# Patient Record
Sex: FEMALE | Race: WHITE
[De-identification: names, ages, dates, MRNs, and addresses within clinical notes are randomized per-mention and may not be internally consistent; named-entity substitution may affect disease eponyms.]

---

## 2023-07-18 ENCOUNTER — HOSPITAL ENCOUNTER (EMERGENCY)
Dept: HOSPITAL 97 - ER | Age: 63
Discharge: HOME | End: 2023-07-18
Payer: COMMERCIAL

## 2023-07-18 VITALS — SYSTOLIC BLOOD PRESSURE: 149 MMHG | DIASTOLIC BLOOD PRESSURE: 85 MMHG

## 2023-07-18 VITALS — TEMPERATURE: 99 F | OXYGEN SATURATION: 100 %

## 2023-07-18 DIAGNOSIS — Z88.1: ICD-10-CM

## 2023-07-18 DIAGNOSIS — N39.0: Primary | ICD-10-CM

## 2023-07-18 LAB
BUN BLD-MCNC: 13 MG/DL (ref 7–18)
GLUCOSE SERPLBLD-MCNC: 95 MG/DL (ref 74–106)
HCT VFR BLD CALC: 37.9 % (ref 36–45)
LYMPHOCYTES # SPEC AUTO: 2.9 K/UL (ref 0.7–4.9)
MCV RBC: 91.2 FL (ref 80–100)
PMV BLD: 7.5 FL (ref 7.6–11.3)
POTASSIUM SERPL-SCNC: 3 MEQ/L (ref 3.5–5.1)
RBC # BLD: 4.15 M/UL (ref 3.86–4.86)

## 2023-07-18 PROCEDURE — 74177 CT ABD & PELVIS W/CONTRAST: CPT

## 2023-07-18 PROCEDURE — 87088 URINE BACTERIA CULTURE: CPT

## 2023-07-18 PROCEDURE — 81001 URINALYSIS AUTO W/SCOPE: CPT

## 2023-07-18 PROCEDURE — 80048 BASIC METABOLIC PNL TOTAL CA: CPT

## 2023-07-18 PROCEDURE — 36415 COLL VENOUS BLD VENIPUNCTURE: CPT

## 2023-07-18 PROCEDURE — 87086 URINE CULTURE/COLONY COUNT: CPT

## 2023-07-18 PROCEDURE — 85025 COMPLETE CBC W/AUTO DIFF WBC: CPT

## 2023-07-18 NOTE — ER
Nurse's Notes                                                                                     

 [facility]                                                                 

Name: Shazia Contreras                                                                                 

Age: 62 yrs                                                                                       

Sex: Female                                                                                       

: 1960                                                                                   

MRN: [id number]                                                                                   

Arrival Date: 2023                                                                          

Time: 13:31                                                                                       

Account#: [id number]                                                                            

Bed 7                                                                                             

Private MD:                                                                                       

Diagnosis: UTI/ Urinary tract infection, site not specified                                       

                                                                                                  

Presentation:                                                                                     

                                                                                             

13:42 Chief complaint: Patient states: feels like she has a UTI, urgency, suprapubic pain     iw  

      since last night, worse now, she had a really bad kidney infection in May. Coronavirus      

      screen: At this time, the client does not indicate any symptoms associated with             

      coronavirus-19. Ebola Screen: Patient negative for fever greater than or equal to 101.5     

      degrees Fahrenheit, and additional compatible Ebola Virus Disease symptoms Patient          

      denies exposure to infectious person. Patient denies travel to an Ebola-affected area       

      in the 21 days before illness onset. No symptoms or risks identified at this time.          

      Initial Sepsis Screen: Does the patient meet any 2 criteria? No. Patient's initial          

      sepsis screen is negative. Does the patient have a suspected source of infection? No.       

      Patient's initial sepsis screen is negative. Risk Assessment: Do you want to hurt           

      yourself or someone else? Patient reports no desire to harm self or others. Onset of        

      symptoms was 2023.                                                                 

13:42 Acuity: ROCIO 3                                                                           iw  

13:42 Method Of Arrival: Ambulatory                                                           iw  

                                                                                                  

Historical:                                                                                       

- Allergies:                                                                                      

13:47 Keflex;                                                                                 iw  

13:47 Flagyl;                                                                                 iw  

- PMHx:                                                                                           

13:47 spinal stenosis; Asthma;                                                                iw  

                                                                                                  

- Immunization history:: Adult Immunizations unknown.                                             

- Social history:: Smoking status: Patient denies any tobacco usage or history of.                

                                                                                                  

                                                                                                  

Screenin:45 [facility] ED Fall Risk Assessment (Adult) Score/Fall Risk Level 0 - 2 = Low Risk. Abuse  eh3 

      screen: Denies threats or abuse. Denies injuries from another. Nutritional screening:       

      No deficits noted. Tuberculosis screening: No symptoms or risk factors identified.          

                                                                                                  

Assessment:                                                                                       

13:45 General: Appears in no apparent distress. uncomfortable, Behavior is cooperative,       eh3 

      appropriate for age. Pain: Complains of pain in abdomen and pelvis. Neuro: Level of         

      Consciousness is awake, alert, obeys commands, Oriented to person, place, time,             

      situation. Cardiovascular: Capillary refill < 3 seconds Patient's skin is warm and dry.     

      Respiratory: Airway is patent Respiratory effort is even, unlabored, Respiratory            

      pattern is regular, symmetrical. GI: Abdomen is round non-distended. : Reports            

      burning with urination, urgency, urinary frequency. Derm: Skin is healthy with good         

      turgor. Musculoskeletal: Circulation, motion, and sensation intact.                         

14:45 Reassessment: Patient appears in no apparent distress at this time. Patient and/or      eh3 

      family updated on plan of care and expected duration. Pain level reassessed. Patient is     

      alert, oriented x 3, equal unlabored respirations, skin warm/dry/pink.                      

15:45 Reassessment: Patient appears in no apparent distress at this time. Patient and/or      eh3 

      family updated on plan of care and expected duration. Pain level reassessed. Patient is     

      alert, oriented x 3, equal unlabored respirations, skin warm/dry/pink.                      

16:45 Reassessment: Patient appears in no apparent distress at this time. Patient and/or      eh3 

      family updated on plan of care and expected duration. Pain level reassessed. Patient is     

      alert, oriented x 3, equal unlabored respirations, skin warm/dry/pink.                      

17:45 Reassessment: Patient appears in no apparent distress at this time. Patient and/or      eh3 

      family updated on plan of care and expected duration. Pain level reassessed. Patient is     

      alert, oriented x 3, equal unlabored respirations, skin warm/dry/pink.                      

18:45 Reassessment: Patient appears in no apparent distress at this time. Patient and/or      eh3 

      family updated on plan of care and expected duration. Pain level reassessed. Patient is     

      alert, oriented x 3, equal unlabored respirations, skin warm/dry/pink.                      

                                                                                                  

Vital Signs:                                                                                      

14:04  / 89; Pulse 77; Resp 18; Temp 99(TE); Pulse Ox 100% on R/A; Weight 50.8 kg;      em1 

      Height 5 ft. 2 in. ; Pain 10/10;                                                            

14:45  / 80; Pulse 76; Resp 18; Pulse Ox 100% on R/A;                                   eh3 

15:45  / 67; Pulse 68; Resp 18; Pulse Ox 100% on R/A;                                   eh3 

16:45  / 81; Pulse 71; Resp 18; Pulse Ox 100% on R/A;                                   eh3 

17:45  / 70; Pulse 68; Resp 18; Pulse Ox 100% on R/A;                                   eh3 

18:45  / 85; Pulse 64; Resp 18; Pulse Ox 100% on R/A;                                   eh3 

14:04 Body Mass Index 20.48 (50.80 kg, 157.48 cm)                                             em1 

14:04 Pain Scale: Adult                                                                       em1 

                                                                                                  

ED Course:                                                                                        

13:34 Patient arrived in ED.                                                                  mr  

13:34 Lexx Arevalo MD is Attending Physician.                                              kdr 

13:37 Romi Valderrama RN is Primary Nurse.                                                        eh3 

13:45 Patient has correct armband on for positive identification. Placed in gown. Bed in low  eh3 

      position. Call light in reach. Side rails up X2. Pulse ox on. NIBP on. Door closed.         

      Noise minimized. Lights dimmed.                                                             

13:47 Triage completed.                                                                       iw  

13:47 Arm band placed on.                                                                     iw  

14:45 Assisted to bedside commode.                                                            eh3 

16:35 Inserted saline lock: 22 gauge in right antecubital area, using aseptic technique.      eh3 

      Blood collected.                                                                            

18:50 CT Abd/Pelvis - IV Contrast Only In Process Unspecified.                                EDMS

19:13 Attending Physician role handed off by Lexx Arevalo MD                               rn  

19:13 Jerry Vo MD is Attending Physician.                                                rn  

19:21 No provider procedures requiring assistance completed. IV discontinued, intact,         eh3 

      bleeding controlled, No redness/swelling at site. Pressure dressing applied.                

                                                                                                  

Administered Medications:                                                                         

14:22 Drug: Norco PO 5 mg-325 mg 1 tabs Route: PO;                                            kc6 

15:25 Follow up: Response: No adverse reaction                                                eh3 

                                                                                                  

                                                                                                  

Medication:                                                                                       

19:22 VIS not applicable for this client.                                                     eh3 

                                                                                                  

Outcome:                                                                                          

19:15 Discharge ordered by MD.                                                                rn  

19:21 Discharged to home ambulatory.                                                          eh3 

19:21 Condition: stable                                                                           

19:21 Discharge instructions given to patient, Instructed on discharge instructions, follow       

      up and referral plans. medication usage, Demonstrated understanding of instructions,        

      follow-up care, medications, Prescriptions given X 1.                                       

19:22 Patient left the ED.                                                                    eh3 

                                                                                                  

Signatures:                                                                                       

Dispatcher [employer]                           EDMS                                                 

Lexx Arevalo MD MD kdr Rivera, Mary                                                                                    

Anais Landa RN                     RN   iw                                                   

Jerry Vo MD MD rn Martinez, Eric                               em1                                                  

Romi Valderrama RN RN   3                                                  

Merrill, Monica, RN                   RN   kc6                                                  

                                                                                                  

**************************************************************************************************

## 2023-07-18 NOTE — RAD REPORT
EXAM DESCRIPTION:  CTAbdomen   Pelvis W Contrast - 7/18/2023 6:48 pm

 

CLINICAL HISTORY:  Abdominal pain.

Pelvic pain

 

COMPARISON:  <Comparisons>

 

TECHNIQUE:  Biphasic CT imaging of the abdomen and pelvis was performed with 100 ml non-ionic IV cont
rast.

 

All CT scans are performed using dose optimization technique as appropriate and may include automated
 exposure control or mA/KV adjustment according to patient size.

 

FINDINGS:  The lung bases are clear.

 

The liver, spleen, pancreas, adrenal glands and kidneys are within normal limits.

 

No bowel obstruction, free air, free fluid or abscess.  The appendix is normal.  No evidence of signi
ficant lymphadenopathy. There is a large amount of stool retained in the colon.

 

No suspicious bony findings.

 

IMPRESSION:  No acute intra-abdominal or pelvic finding.

 

Significant fecal retention.

## 2023-07-18 NOTE — EDPHYS
Physician Documentation                                                                           

 [facility]                                                                 

Name: Shazia Contreras                                                                                 

Age: 62 yrs                                                                                       

Sex: Female                                                                                       

: 1960                                                                                   

MRN: [id number]                                                                                   

Arrival Date: 2023                                                                          

Time: 13:31                                                                                       

Account#: [id number]                                                                            

Bed 7                                                                                             

Private MD:                                                                                       

ED Physician Jerry Vo                                                                         

HPI:                                                                                              

                                                                                             

17:01 This 62 yrs old Female presents to ER via Ambulatory with complaints of Urinary Problem.kdr 

17:01 Patient presents with urinary tract symptoms. She has been having intermittent          kdr 

      infections since May. She has been on antibiotics for 3 separate occasions. She was         

      initially placed on Levaquin and did get some results and improvement. Once that was        

      completed she had a period of wellness and then had a return of her symptoms. She was       

      subsequently put on cefuroxime and improved again with that medication and subsequently     

      had a period of wellness. Then most recently she was put on a 10-day course of Bactrim      

      with return of symptoms and again had some improvement until today. Patient's not had       

      anything like this before. She is not currently sexually active. Her last sexual            

      activity was number of months ago. She denies any other injury she denies vaginal           

      discharge. She looks mildly uncomfortable but nontoxic. Onset: The symptoms/episode         

      began/occurred 2 day(s) ago. Severity of symptoms: At their worst the symptoms were         

      mild moderate just prior to arrival, in the emergency department the symptoms are           

      unchanged. The patient has not experienced similar symptoms in the past. The patient        

      has not recently seen a physician.                                                          

                                                                                                  

Historical:                                                                                       

- Allergies:                                                                                      

13:47 Keflex;                                                                                 iw  

13:47 Flagyl;                                                                                 iw  

- PMHx:                                                                                           

13:47 spinal stenosis; Asthma;                                                                iw  

                                                                                                  

- Immunization history:: Adult Immunizations unknown.                                             

- Social history:: Smoking status: Patient denies any tobacco usage or history of.                

                                                                                                  

                                                                                                  

ROS:                                                                                              

17:01 Constitutional: Negative for fever, chills, and weight loss, Eyes: Negative for injury, kdr 

      pain, redness, and discharge, Neck: Negative for injury, pain, and swelling,                

      Cardiovascular: Negative for chest pain, palpitations, and edema, Respiratory: Negative     

      for shortness of breath, cough, wheezing, and pleuritic chest pain, Abdomen/GI:             

      Negative for abdominal pain, nausea, vomiting, diarrhea, and constipation, Back:            

      Negative for injury and pain, MS/Extremity: Negative for injury and deformity, Skin:        

      Negative for injury, rash, and discoloration, Neuro: Negative for headache, weakness,       

      numbness, tingling, and seizure activity. Psych: Negative for depression, anxiety,          

      suicide ideation, homicidal ideation, and hallucinations, Allergy/Immunology: Negative      

      for hives, rash, and allergies, Endocrine: Negative for neck swelling, polydipsia,          

      polyuria, polyphagia, and marked weight changes, Hematologic/Lymphatic: Negative for        

      swollen nodes, abnormal bleeding, and unusual bruising.                                     

                                                                                                  

Exam:                                                                                             

17:02 Constitutional:  This is a well developed, well nourished patient who is awake, alert,  kdr 

      and in no acute distress. Head/Face:  Normocephalic, atraumatic. Eyes:  Pupils equal        

      round and reactive to light, extra-ocular motions intact.  Lids and lashes normal.          

      Conjunctiva and sclera are non-icteric and not injected.  Cornea within normal limits.      

      Periorbital areas with no swelling, redness, or edema. Neck:  Trachea midline, no           

      thyromegaly or masses palpated, and no cervical lymphadenopathy.  Supple, full range of     

      motion without nuchal rigidity, or vertebral point tenderness.  No Meningismus.             

      Chest/axilla:  Normal chest wall appearance and motion.  Nontender with no deformity.       

      No lesions are appreciated. Cardiovascular:  Regular rate and rhythm with a normal S1       

      and S2.  No gallops, murmurs, or rubs.  Normal PMI, no JVD.  No pulse deficits.             

      Respiratory:  Lungs have equal breath sounds bilaterally, clear to auscultation and         

      percussion.  No rales, rhonchi or wheezes noted.  No increased work of breathing, no        

      retractions or nasal flaring. Abdomen/GI:  Soft, non-tender, with normal bowel sounds.      

      No distension or tympany.  No guarding or rebound.  No evidence of tenderness               

      throughout. Back:  No spinal tenderness.  No costovertebral tenderness.  Full range of      

      motion. Skin:  Warm, dry with normal turgor.  Normal color with no rashes, no lesions,      

      and no evidence of cellulitis. MS/ Extremity:  Pulses equal, no cyanosis.                   

      Neurovascular intact.  Full, normal range of motion. Neuro:  Awake and alert, GCS 15,       

      oriented to person, place, time, and situation.  Cranial nerves II-XII grossly intact.      

      Motor strength 5/5 in all extremities.  Sensory grossly intact.  Cerebellar exam            

      normal.  Normal gait. Psych:  Awake, alert, with orientation to person, place and time.     

       Behavior, mood, and affect are within normal limits.                                       

                                                                                                  

Vital Signs:                                                                                      

14:04  / 89; Pulse 77; Resp 18; Temp 99(TE); Pulse Ox 100% on R/A; Weight 50.8 kg;      em1 

      Height 5 ft. 2 in. ; Pain 10/10;                                                            

14:45  / 80; Pulse 76; Resp 18; Pulse Ox 100% on R/A;                                   eh3 

15:45  / 67; Pulse 68; Resp 18; Pulse Ox 100% on R/A;                                   eh3 

16:45  / 81; Pulse 71; Resp 18; Pulse Ox 100% on R/A;                                   eh3 

17:45  / 70; Pulse 68; Resp 18; Pulse Ox 100% on R/A;                                   eh3 

18:45  / 85; Pulse 64; Resp 18; Pulse Ox 100% on R/A;                                   eh3 

14:04 Body Mass Index 20.48 (50.80 kg, 157.48 cm)                                             em1 

14:04 Pain Scale: Adult                                                                       em1 

                                                                                                  

MDM:                                                                                              

17:02 Data reviewed: vital signs, nurses notes. ED course: I discussed the initial lab        kdr 

      results (urinalysis) with the patient, while there was a suggestion of a urinary tract      

      infection is not an overwhelming showing. We discussed her pain and whether it was          

      strictly related to her urination in her urethra where there was more diffuse or            

      involving more of her pelvis. Patient felt that while at was primarily urethral in          

      nature she did feel generally uncomfortable to her pelvis. Given that information, she      

      requested a CAT scan of her abdomen and pelvis which we proceeded to do. Patient            

      otherwise was stable in the ED..                                                            

19:13 Differential Diagnosis UTI, pyelonephritis, dehydration, constipation. Counseling: I    rn  

      had a detailed discussion with the patient and/or guardian regarding: the historical        

      points, exam findings, and any diagnostic results supporting the discharge/admit            

      diagnosis, lab results, radiology results, the need for outpatient follow up, to return     

      to the emergency department if symptoms worsen or persist or if there are any questions     

      or concerns that arise at home. Special discussion: I discussed with the                    

      patient/guardian in detail that at this point there is no indication for admission to       

      the hospital. It is understood, however, that if the symptoms persist or worsen the         

      patient needs to return immediately for re-evaluation. ED course: Pt signed out to me       

      by Dr. Arevalo, pending CT abdomen, plan was to dc home with levaquin if CT abdomen         

      neg. CT abdomen returned without acute findings, no pyelonephritis, will dc home as         

      planned with Dr. Arevalo with levaquin and pcp f/u. .                                       

19:15 Patient medically screened.                                                             rn  

                                                                                                  

                                                                                             

13:39 Order name: Urinalysis w/ reflexes; Complete Time: 16:13                                kdr 

                                                                                             

14:03 Order name: Urine Culture                                                               kdr 

                                                                                             

16:23 Order name: CBC with Diff; Complete Time: 19:00                                         kdr 

                                                                                             

16:23 Order name: Chem 7; Complete Time: 19:00                                                kdr 

                                                                                             

16:23 Order name: CT Abd/Pelvis - IV Contrast Only; Complete Time: 19:13                      kdr 

                                                                                                  

Administered Medications:                                                                         

14:22 Drug: Norco PO 5 mg-325 mg 1 tabs Route: PO;                                            kc6 

15:25 Follow up: Response: No adverse reaction                                                eh3 

                                                                                                  

                                                                                                  

Disposition Summary:                                                                              

23 19:15                                                                                    

Discharge Ordered                                                                                 

      Location: Home                                                                          rn  

      Problem: an ongoing problem                                                             rn  

      Symptoms: have improved                                                                 rn  

      Condition: Stable                                                                       rn  

      Diagnosis                                                                                   

        - UTI/ Urinary tract infection, site not specified                                    rn  

      Followup:                                                                               rn  

        - With: Private Physician                                                                  

        - When: As needed                                                                          

        - Reason: Recheck today's complaints, Re-evaluation by your physician                      

      Discharge Instructions:                                                                     

        - Discharge Summary Sheet                                                             rn  

        - Urinary Tract Infection, Adult                                                      rn  

      Forms:                                                                                      

        - Medication Reconciliation Form                                                      rn  

        - Thank You Letter                                                                    rn  

        - Antibiotic Education                                                                rn  

        - Prescription Opioid Use                                                             rn  

        - Patient Portal Instructions                                                         rn  

      Prescriptions:                                                                              

        - levofloxacin 500 mg Oral Tablet                                                          

            - take 1 tablet by ORAL route once daily for 10 days; 10 tablet; Refills: 0,      rn  

      Product Selection Permitted                                                                 

Signatures:                                                                                       

Dispatcher [employer]                           EDMS                                                 

Lexx Arevalo MD MD kdr Williams, Irene RN                     Jerry Colorado MD MD rn Campbell, Kaitlyn, RN RN   [facility]                                                  

Romi Valderrama RN   3                                                  

                                                                                                  

**************************************************************************************************

## 2023-07-18 NOTE — XMS REPORT
Continuity of Care Document

                            Created on:2023



Patient:HERMAN BOLES

Sex:Female

:1960

External Reference #:[id number]





Demographics







                          Address                   [address]



                                                    [place] [address]

 

                          Home Phone                [phone]

 

                          Email Address             [email or web address]

 

                          Preferred Language        English

 

                          Marital Status            Unknown

 

                          Christian Affiliation     Unknown

 

                          Race                      Unknown

 

                          Additional Race(s)        Unavailable



                                                    White

 

                          Ethnic Group              Not  or 









Author







                          Organization              [facility]

t

 

                          Address                   [address]



                                                    [place] [address]

 

                          Phone                     [phone]









Support







                Name            Relationship    Address         Phone

 

                DO OBIE YANEZ Emergency Provider [address] [phone]



                                                [place] [address] 

 

                LORENZA PARIS Parent          Unavailable     [phone]

 

                LORENZA PARIS Parent          Unavailable     [phone]

 

                HERMAN BOLES Guarantor       [address] [phone]



                                                [place] [address] 









Care Team Providers







                    Name                Role                Phone

 

                    DENIS YADAV Primary Care Physician Unavailable

 

                    SULLY GRIDER   Attending Clinician Unavailable

 

                    DENIS YADAV Attending Clinician Unavailable

 

                    [facility] Attending Clinician UnavailOBIE Gabriel     Attending Clinician Unavailable









Problems







       Condition Condition Condition Status Onset  Resolution Last   Treating Co

mments 

Source



       Name   Details Category        Date   Date   Treatment Clinician        



                                                 Date                 

 

       Problem        Condition                                           [facility]







Allergies, Adverse Reactions, Alerts







       Allergy Allergy Status Severity Reaction(s) Onset  Inactive Treating Comm

ents 

Source



       Name   Type                        Date   Date   Clinician        

 

       Cephalex Allergy Active Unknown                              [facility]

U



       in     to                                                  S



              substanc                      00:00:                      Health



              e                           00                          

 

       Ketamine Allergy Active Unknown                              [facility]

U



              to                          208                        S



              substanc                      00:00:                      Health



              e                           00                          







Social History







           Social Habit Start Date Stop Date  Quantity   Comments   Source

 

           Sex Assigned At 1960 Female                [facility]



           Birth      00:00:00   00:00:00                         









                Smoking Status  Start Date      Stop Date       Source

 

                Never smoked tobacco (finding)                                 C

PolySpot







Medications







       Ordered Filled Start  Stop   Current Ordering Indication Dosage Frequency

 Signature

                    Comments            Components          Source



     Medication Medication Date Date Medication? Clinician                (SIG) 

          



     Name Name                                                   

 

     Sucralfate      2019-0      No             1         Before           



     (Carafate)      2-08                               Meals And           S



     1 Gm TAB      23:08:                               At Bedtime           Hea

lth



               00                                                

 

     Sucralfate      2019-0      No             1         Before           DAJA

TU



     (Carafate)      2-08                               Meals And           S



     1 Gm TAB      23:08:                               At Bedtime           Hea

lth



               00                                                

 

     Acetaminoph                No             1         Every 6           DAJA

TU



     en/Hydrocod                                         Hours as           S



     one Bitart                                         needed for           Hea

lth



     (Norco                                         Pain           



     5/325) 1                                                        



     Tab TAB                                                        

 

     Albuterol                No             2         As Needed           DAJA

TU



     Sulfate                                         as needed           S



     (Ventolin                                         for            Health



     Hfa Inh 18                                         Shortness           



     Gm) 8 Gm                                         Of Breath           



     HFA.AER.AD                                                        

 

     Atorvastati                No                       Bedtime           DAJA

TU



     n Calcium                                                        S



     (Lipitor)                                                        Health



     10 Mg TAB                                                        

 

     Clonazepam                No             .1mg      Bedtime           [facility]

U



     (Klonopin                                                        S



     Liq) 0.1                                                        Health



     Mg/Ml SOLN                                                        

 

     Hctz/Losart                No                       Daily           [facility]



     an                                                          S



     Potassium                                                        Health



     (Hyzaar                                                        



     100-25) 1                                                        



     Each TAB                                                        

 

     Acetaminoph                No             1         Every 6           DAJA

TU



     en/Hydrocod                                         Hours as           S



     one Bitart                                         needed for           Hea

lth



     (Norco                                         Pain           



     5/325) 1                                                        



     Tab TAB                                                        

 

     Albuterol                No             2         As Needed           DAJA

TU



     Sulfate                                         as needed           S



     (Ventolin                                         for            Health



     Hfa Inh 18                                         Shortness           



     Gm) 8 Gm                                         Of Breath           



     HFA.AER.AD                                                        

 

     Atorvastati                No                       Bedtime           [facility]

TU



     n Calcium                                                        S



     (Lipitor)                                                        Health



     10 Mg TAB                                                        

 

     Clonazepam                No             .1mg      Bedtime           [facility]

U



     (Klonopin                                                        S



     Liq) 0.1                                                        Health



     Mg/Ml SOLN                                                        

 

     Hctz/Losart                No                       Daily           [facility]



     an                                                          S



     Potassium                                                        Health



     (Hyzaar                                                        



     100-25) 1                                                        



     Each TAB                                                        







Procedures

This patient has no known procedures.



Encounters







        Start   End     Encounter Admission Attending Care    Care    Encounter 

Source



        Date/Time Date/Time Type    Type    Clinicians Facility Department ID   

   

 

        2021-03-15         Inpatient [facility] [id number]

[id number] [facility]



        08:30:00                         SULLY                    43      S



                                                                        Health

 

        2020         Inpatient [facility] [place] SUE 

[id number] [facility]



        14:00:00                         , DENIS                 78      S



                                                                        Health

 

        2023 Outpatient [facility]  MF0

[id number] [facility]



        12:25:00 12:25:00                 [facility],                    -[id number] [facility]



                                                                        Hospita



                                                                        l

 

        [id number] emergency                 [id number] AA

[id number] 



        06:33:00 11:15:00                         [id number]      



                                                [id number]         



                                                [id number]             

 

        [facility]23 [facility]  [id number]

71 [facility]



        06:33:00 11:15:00                 [id number] [facility]



                                                                        Hospita



                                                                        

 

        2021 Outpatient [facility] SUE [facility]

[id number] 

[facility]



        14:00:00 00:01:00                 , [place]                 73      [facility] [address]

 

        2021-10-26 2021-10-31 Outpatient [facility] S[facility]

[id number] 

[facility]



        13:30:00 00:01:00                 , [place]                 91      [facility] [address]

 

        2021-04-15 2021-04-15 Outpatient [facility] S[facility]OG[facility] AA

[id number] 

[facility]



        13:21:00 13:21:00                 DIR                    82      S



                                                                        Health

 

        2020 Discharged [facility] SUE BORDEN

[id number] 

[facility]



        13:00:00 13:00:00 Recurring         DENIS                 21      S



                                                                        Health

 

        2020 Discharged                 BRITTANY [facility] AA00

[id number] [facility]



        13:47:00 23:59:00 Recurring                 [facility] 55      S



                                                                        Health







Results

This patient has no known results.

## 2023-07-27 ENCOUNTER — HOSPITAL ENCOUNTER (EMERGENCY)
Dept: HOSPITAL 97 - ER | Age: 63
Discharge: HOME | End: 2023-07-27
Payer: COMMERCIAL

## 2023-07-27 VITALS — OXYGEN SATURATION: 98 % | DIASTOLIC BLOOD PRESSURE: 81 MMHG | SYSTOLIC BLOOD PRESSURE: 125 MMHG

## 2023-07-27 VITALS — TEMPERATURE: 99.3 F

## 2023-07-27 DIAGNOSIS — Z88.8: ICD-10-CM

## 2023-07-27 DIAGNOSIS — N30.10: Primary | ICD-10-CM

## 2023-07-27 DIAGNOSIS — Z88.1: ICD-10-CM

## 2023-07-27 LAB
ALBUMIN SERPL BCP-MCNC: 4.1 G/DL (ref 3.4–5)
ALP SERPL-CCNC: 81 U/L (ref 45–117)
ALT SERPL W P-5'-P-CCNC: 57 U/L (ref 13–56)
AST SERPL W P-5'-P-CCNC: 23 U/L (ref 15–37)
BUN BLD-MCNC: 20 MG/DL (ref 7–18)
GLUCOSE SERPLBLD-MCNC: 108 MG/DL (ref 74–106)
HCT VFR BLD CALC: 36.4 % (ref 36–45)
LYMPHOCYTES # SPEC AUTO: 1.3 K/UL (ref 0.7–4.9)
MCV RBC: 89.6 FL (ref 80–100)
PMV BLD: 7 FL (ref 7.6–11.3)
POTASSIUM SERPL-SCNC: 3.2 MEQ/L (ref 3.5–5.1)
RBC # BLD: 4.06 M/UL (ref 3.86–4.86)

## 2023-07-27 PROCEDURE — 81003 URINALYSIS AUTO W/O SCOPE: CPT

## 2023-07-27 PROCEDURE — 85025 COMPLETE CBC W/AUTO DIFF WBC: CPT

## 2023-07-27 PROCEDURE — 80053 COMPREHEN METABOLIC PANEL: CPT

## 2023-07-27 PROCEDURE — 99284 EMERGENCY DEPT VISIT MOD MDM: CPT

## 2023-07-27 PROCEDURE — 36415 COLL VENOUS BLD VENIPUNCTURE: CPT

## 2023-07-27 PROCEDURE — 96360 HYDRATION IV INFUSION INIT: CPT

## 2023-07-27 NOTE — XMS REPORT
Continuity of Care Document

                            Created on:2023



Patient:HERMAN BOLES

Sex:Female

:1960

External Reference #:[id number]





Demographics







                          Address                   [address] [place] [address]

 

                          Home Phone                [phone]

 

                          Email Address             [email or web address]

 

                          Preferred Language        English

 

                          Marital Status            Unknown

 

                          Latter-day Affiliation     Unknown

 

                          Race                      Unknown

 

                          Additional Race(s)        White



                                                    Unavailable

 

                          Ethnic Group              Not  or 









Author







                          Organization              [facility]

t

 

                          Address                   [address]



                                                    [place] [address]

 

                          Phone                     [phone]









Support







                Name            Relationship    Address         Phone

 

                DO OBIE YANEZ Emergency Provider [address] [phone]



                                                [place] [address] 

 

                LORENZA PAR[place] Parent          Unavailable     [phone]

 

                LORENZA PARIS Parent          Unavailable     [phone]

 

                HERMAN BOLES Guarantor       [address] [phone]



                                                [place] [address] 









Care Team Providers







                    Name                Role                Phone

 

                    DENIS YADAV Primary Care Physician Unavailable

 

                    SULLY GRIDER   Attending Clinician Unavailable

 

                    DENIS YADAV Attending Clinician Unavailable

 

                    [facility] Attending Clinician UnavailOBIE Gabriel     Attending Clinician Unavailable









Problems







       Condition Condition Condition Status Onset  Resolution Last   Treating Co

mments 

Source



       Name   Details Category        Date   Date   Treatment Clinician        



                                                 Date                 

 

       Problem        Condition                                           [facility]







Allergies, Adverse Reactions, Alerts







       Allergy Allergy Status Severity Reaction(s) Onset  Inactive Treating Comm

ents 

Source



       Name   Type                        Date   Date   Clinician        

 

       Cephalex Allergy Active Unknown                              [facility]

U



       in     to                                                  S



              substanc                      00:00:                      Health



              e                           00                          

 

       Ketamine Allergy Active Unknown                              [facility]

U



              to                          2                        S



              substanc                      00:00:                      Health



              e                           00                          







Social History







           Social Habit Start Date Stop Date  Quantity   Comments   Source

 

           Sex Assigned At 1960 Female                [facility]



           Birth      00:00:00   00:00:00                         









                Smoking Status  Start Date      Stop Date       Source

 

                Never smoked tobacco (finding)                                 C

CR2







Medications







       Ordered Filled Start  Stop   Current Ordering Indication Dosage Frequency

 Signature

                    Comments            Components          Source



     Medication Medication Date Date Medication? Clinician                (SIG) 

          



     Name Name                                                   

 

     Sucralfate      2019-0      No             1         Before           DAJA





     (Carafate)      2-                               Meals And           S



     1 Gm TAB      23:08:                               At Bedtime           Hea

lth



               00                                                

 

     Sucralfate      2019-0      No             1         Before           DAJA

TU



     (Carafate)      2-08                               Meals And           S



     1 Gm TAB      23:08:                               At Bedtime           Hea

lth



               00                                                

 

     Acetaminoph                No             1         Every 6           DAJA

TU



     en/Hydrocod                                         Hours as           S



     one Bitart                                         needed for           Hea

lth



     (Norco                                         Pain           



     5/325) 1                                                        



     Tab TAB                                                        

 

     Albuterol                No             2         As Needed           DAJA

TU



     Sulfate                                         as needed           S



     (Ventolin                                         for            Health



     Hfa Inh 18                                         Shortness           



     Gm) 8 Gm                                         Of Breath           



     HFA.AER.AD                                                        

 

     Atorvastati                No                       Bedtime           [facility]

TU



     n Calcium                                                        S



     (Lipitor)                                                        Health



     10 Mg TAB                                                        

 

     Clonazepam                No             .1mg      Bedtime           [facility]

U



     (Klonopin                                                        S



     Liq) 0.1                                                        Health



     Mg/Ml SOLN                                                        

 

     Hctz/Losart                No                       Daily           [facility]



     an                                                          S



     Potassium                                                        Health



     (Hyzaar                                                        



     100-25) 1                                                        



     Each TAB                                                        

 

     Acetaminoph                No             1         Every 6           DAJA

TU



     en/Hydrocod                                         Hours as           S



     one Bitart                                         needed for           Hea

lth



     (Norco                                         Pain           



     5/325) 1                                                        



     Tab TAB                                                        

 

     Albuterol                No             2         As Needed           DAJA

TU



     Sulfate                                         as needed           S



     (Ventolin                                         for            Health



     Hfa Inh 18                                         Shortness           



     Gm) 8 Gm                                         Of Breath           



     HFA.AER.AD                                                        

 

     Atorvastati                No                       Bedtime           DAJA

TU



     n Calcium                                                        S



     (Lipitor)                                                        Health



     10 Mg TAB                                                        

 

     Clonazepam                No             .1mg      Bedtime           [facility]

U



     (Klonopin                                                        S



     Liq) 0.1                                                        Health



     Mg/Ml SOLN                                                        

 

     Hctz/Losart                No                       Daily           [facility]



     an                                                          S



     Potassium                                                        Health



     (Hyzaar                                                        



     100-25) 1                                                        



     Each TAB                                                        







Procedures

This patient has no known procedures.



Encounters







        Start   End     Encounter Admission Attending Care    Care    Encounter 

Source



        Date/Time Date/Time Type    Type    Clinicians Facility Department ID   

   

 

        2021-03-15         Inpatient [facility] [id number]

[id number] [facility]



        08:30:00                         [facility]                    43      [facility]

 

        2020         Inpatient [facility] 

[id number] [facility]



        14:00:00                         , DENIS                 [address] [place] [address]

 

        2023 Outpatient [facility]  MF0

[id number] [facility]



        12:25:00 12:25:00                 SHARON,                    -[id number] [facility]



                                                                        Hospita



                                                                        l

 

        2023 emergency                 [id number] AA

[id number] 



        06:33:00 11:15:00                         [id number]      



                                                [id number]         



                                                [id number]             

 

        2023 Emergency ER      [facility], [facility]  [id number]

71 [facility]



        06:33:00 11:15:00                 [id number] [facility]



                                                                        Hospita



                                                                        

 

        2021 Outpatient [facility] SUE [facility]

[id number] 

[facility]



        14:00:00 00:01:00                 , [place]                 73      S



                                                                        Health

 

        2021-10-26 2021-10-31 Outpatient [facility] S[facility]

[id number] 

[facility]



        13:30:00 00:01:00                 , DENIS                 91      S



                                                                        Health

 

        2021-04-15 2021-04-15 Outpatient [facility] SUE ROG[facility] AA

[id number] 

[facility]



        13:21:00 13:21:00                 DIRK                    82      S



                                                                        Health

 

        2020 Discharged [facility] SUE BORDEN

[id number] 

[facility]



        13:00:00 13:00:00 Recurring         DENIS                 21      S



                                                                        Health

 

        2020 Discharged                 [facility] AA00

[id number] [facility]



        13:47:00 23:59:00 Recurring                 [facility] 55      S



                                                                        Health







Results

This patient has no known results.

## 2023-07-27 NOTE — EDPHYS
Physician Documentation                                                                           

 [facility]                                                                 

Name: Shazia Contreras                                                                                 

Age: 62 yrs                                                                                       

Sex: Female                                                                                       

: 1960                                                                                   

MRN: [id number]                                                                                   

Arrival Date: 2023                                                                          

Time: 13:44                                                                                       

Account#: [id number]                                                                            

Bed 9                                                                                             

Private MD:                                                                                       

ED Physician Gregg Pineda                                                                         

HPI:                                                                                              

                                                                                             

14:26 This 62 yrs old Female presents to ER via Ambulatory with complaints of Urinary Problem.ms3 

14:26 62-year-old female with past medical history of asthma, spinal stenosis presents for    ms3 

      urinary frequency, urgency, dysuria. Patient states she was diagnosed with                  

      pyelonephritis on 2023. Patient was then diagnosed with a urinary tract             

      infection in this emergency department on . Patient states for the last 3 days       

      her symptoms have become worse and she has developed body aches, fever, chills, nausea.     

      Patient denies vomiting. Patient states her symptoms are worse with urination. Patient      

      denies alleviating factors.                                                                 

                                                                                                  

Historical:                                                                                       

- Allergies:                                                                                      

13:52 Flagyl;                                                                                 aa5 

13:52 Keflex;                                                                                 aa5 

- PMHx:                                                                                           

13:52 Asthma; spinal stenosis;                                                                aa5 

                                                                                                  

- Immunization history:: Adult Immunizations unknown.                                             

- Social history:: Smoking status: Patient denies any tobacco usage or history of.                

                                                                                                  

                                                                                                  

ROS:                                                                                              

14:26  Positive for urinary symptoms, urinary frequency, burning with urination.            ms3 

14:26 Constitutional: Negative for fever, and chills. ENT: Negative for injury, pain, and         

      discharge, Neck: Negative for injury, pain, and swelling, Cardiovascular: Negative for      

      chest pain, and palpitations. Respiratory: Negative for shortness of breath, cough,         

      wheezing, and pleuritic chest pain, Abdomen/GI: Negative for abdominal pain, nausea,        

      vomiting, diarrhea, and constipation, MS/Extremity: Negative for injury and deformity,      

      Skin: Negative for injury, rash, and discoloration.                                         

14:26 All other systems are negative.                                                             

                                                                                                  

Exam:                                                                                             

14:26 Constitutional:  This is a well developed, well nourished patient who is awake, alert,  ms3 

      and in no acute distress. Head/Face:  Normocephalic, atraumatic. Neck:  Trachea             

      midline, no cervical lymphadenopathy.  Supple, full range of motion without nuchal          

      rigidity, or vertebral point tenderness.  No Meningismus. Chest/axilla:  Normal chest       

      wall appearance and motion.  Nontender with no deformity.   Cardiovascular:  Regular        

      rate and rhythm with a normal S1 and S2.  No gallops, murmurs, or rubs.  Normal PMI, no     

      JVD.  No pulse deficits. Respiratory:  Lungs have equal breath sounds bilaterally,          

      clear to auscultation and percussion.  No rales, rhonchi or wheezes noted.  No              

      increased work of breathing, no retractions or nasal flaring. Abdomen/GI:  Soft,            

      non-tender, with normal bowel sounds.  No distension or tympany.  No guarding or            

      rebound.  No evidence of tenderness throughout. Female :  Normal external genitalia.      

      Skin:  Warm, dry with normal turgor.  Normal color with no rashes, no lesions, and no       

      evidence of cellulitis. MS/ Extremity:  Pulses equal, no cyanosis.  Neurovascular           

      intact.  Full, normal range of motion.                                                      

14:26 : Chaperone: JUAN Marlow.                                                                

                                                                                                  

Vital Signs:                                                                                      

13:50  / 97; Pulse 100; Resp 20 S; Temp 97.1(TE); Pulse Ox 100% on R/A; Weight 49.9 kg  aa5 

      (R); Height 5 ft. 2 in. (R);                                                                

14:40  / 87; Pulse 88; Resp 17; Temp 99.3(O); Pulse Ox 100% on R/A; Pain 9/10;          me1 

15:29  / 81; Pulse 84; Resp 17; Pulse Ox 98% on R/A; Pain 7/10;                         me1 

13:50 Body Mass Index 20.12 (49.90 kg, 157.48 cm)                                             aa5 

14:40 Pain Scale: Adult                                                                       me1 

15:29 Pain Scale: Adult                                                                       me1 

                                                                                                  

MDM:                                                                                              

14:08 Patient medically screened.                                                             ms3 

17:24 Differential diagnosis: urinary tract infection, Interstitial cystitis versus dysuria.  ms3 

      Data reviewed: vital signs, nurses notes, lab test result(s), and as a result, I will       

      discharge patient. I considered the following discharge prescriptions or medication         

      management in the emergency department Medications were administered in the Emergency       

      Department. See MAR. Counseling: I had a detailed discussion with the patient and/or        

      guardian regarding: the historical points, exam findings, and any diagnostic results        

      supporting the discharge/admit diagnosis, lab results, the need for outpatient follow       

      up, to return to the emergency department if symptoms worsen or persist or if there are     

      any questions or concerns that arise at home. ED course: Discussed follow-up with Dr. Beaulieu and Dr. Bray with patient. Patient understands and agrees with plan. All           

      questions were answered. Patient given prescription for amitriptyline. UA negative          

      today, white blood count normal. Return precautions discussed include worsening             

      symptoms, or any other concerns. On reevaluation patient is alert and oriented x4, no       

      apparent distress, nontoxic-appearing, ambulatory in emergency department, speaking         

      full sentences.                                                                             

                                                                                                  

                                                                                             

14:03 Order name: CBC with Diff; Complete Time: 15:09                                         ms3 

                                                                                             

14:03 Order name: CMP; Complete Time: 15:19                                                   ms3 

                                                                                             

14:03 Order name: Urinalysis w/ reflexes; Complete Time: 15:09                                ms3 

                                                                                                  

Administered Medications:                                                                         

14:14 Drug: Phenazopyridine  mg Route: PO;                                              me1 

14:50 Follow up: Response: No adverse reaction; Marked relief of symptoms                     me1 

14:44 Drug: Lactated Ringers Solution IV 1000 ml Route: IV; Rate: bolus; Site: left           me1 

      antecubital;                                                                                

15:55 Follow up: IV Status: Completed infusion; IV Intake: 1000ml                             me1 

                                                                                                  

                                                                                                  

Disposition Summary:                                                                              

23 15:54                                                                                    

Discharge Ordered                                                                                 

      Location: Home                                                                          ms3 

      Condition: Stable                                                                       ms3 

      Diagnosis                                                                                   

        - Dysuria                                                                             ms3 

        - Interstitial cystitis (chronic) without hematuria                                   ms3 

      Followup:                                                                               ms3 

        - With: Ervin Beaulieu DO                                                                  

        - When: 2 - 3 days                                                                         

        - Reason: Recheck today's complaints                                                       

      Followup:                                                                               ms3 

        - With: Damian rBay MD                                                                

        - When: 2 - 3 days                                                                         

        - Reason: Recheck today's complaints                                                       

      Discharge Instructions:                                                                     

        - Discharge Summary Sheet                                                             ms3 

        - Dysuria                                                                             ms3 

        - Interstitial Cystitis                                                               ms3 

      Forms:                                                                                      

        - Medication Reconciliation Form                                                      ms3 

        - Thank You Letter                                                                    ms3 

        - Antibiotic Education                                                                ms3 

        - Prescription Opioid Use                                                             ms3 

        - Patient Portal Instructions                                                         ms3 

      Prescriptions:                                                                              

        - Amitriptyline 25 mg Oral Tablet                                                          

            - take 1 tablet by ORAL route At bedtime As needed; 15 tablet; Refills: 0,        ms3 

      Product Selection Permitted                                                                 

Signatures:                                                                                       

Dispatcher [employer]                           Brooklyn Aquino RN                     RN   aa5                                                  

Gregg Pineda DO                        DO   ms3                                                  

Ele Brown RN                  RN   me1                                                  

                                                                                                  

**************************************************************************************************

## 2023-07-27 NOTE — ER
Nurse's Notes                                                                                     

 [facility]                                                                 

Name: Shazia Contreras                                                                                 

Age: 62 yrs                                                                                       

Sex: Female                                                                                       

: 1960                                                                                   

MRN: [id number]                                                                                   

Arrival Date: 2023                                                                          

Time: 13:44                                                                                       

Account#: [id number]                                                                            

Bed 9                                                                                             

Private MD:                                                                                       

Diagnosis: Dysuria;Interstitial cystitis (chronic) without hematuria                              

                                                                                                  

Presentation:                                                                                     

                                                                                             

13:50 Chief complaint: Patient states: burning with urination, urinary urgency/frequency, and aa5 

      decreased urinary output. Pt reports she is taking Levaquin for UTI diagnosed on            

      23. Coronavirus screen: At this time, the client does not indicate any symptoms        

      associated with coronavirus-19. Ebola Screen: Patient denies travel to an                   

      Ebola-affected area in the 21 days before illness onset. Initial Sepsis Screen: Does        

      the patient meet any 2 criteria? HR > 90 bpm. Does the patient have a suspected source      

      of infection? Yes: Dysuria/Frequency/Urgency/UTI. Risk Assessment: Do you want to hurt      

      yourself or someone else? Patient reports no desire to harm self or others. Onset of        

      symptoms was .                                                                          

13:50 Acuity: ROCIO 3                                                                           aa5 

13:50 Method Of Arrival: Ambulatory                                                           aa5 

                                                                                                  

Historical:                                                                                       

- Allergies:                                                                                      

13:52 Flagyl;                                                                                 aa5 

13:52 Keflex;                                                                                 aa5 

- PMHx:                                                                                           

13:52 Asthma; spinal stenosis;                                                                aa5 

                                                                                                  

- Immunization history:: Adult Immunizations unknown.                                             

- Social history:: Smoking status: Patient denies any tobacco usage or history of.                

                                                                                                  

                                                                                                  

Screenin:52 [facility] ED Fall Risk Assessment (Adult) Score/Fall Risk Level 0 - 2 = Low Risk. Abuse  me1 

      screen: Denies threats or abuse. Nutritional screening: No deficits noted. Tuberculosis     

      screening: No symptoms or risk factors identified.                                          

                                                                                                  

Assessment:                                                                                       

14:45 General: Appears uncomfortable, slender, well groomed, well developed, well nourished,  me1 

      Behavior is cooperative, anxious, restless, Reports fatigue for burning, urinary            

      frequency and urgency with sharp pain after urinating at the meatus. Pain: Complains of     

      pain in urinary meatus and suprapubic abdomen. Pain currently is 8 out of 10 on a pain      

      scale. Quality of pain is described as sharp, Pain began 2-3 days ago. Neuro: Level of      

      Consciousness is awake, alert, obeys commands, Oriented to person, place, time,             

      situation. Cardiovascular: No deficits noted. Capillary refill < 3 seconds is brisk in      

      bilateral fingers toes. Respiratory: Respiratory effort is even, unlabored, Respiratory     

      pattern is regular, symmetrical. : Reports burning with urination, cramping, pain         

      urgency, urinary frequency.                                                                 

                                                                                                  

Vital Signs:                                                                                      

13:50  / 97; Pulse 100; Resp 20 S; Temp 97.1(TE); Pulse Ox 100% on R/A; Weight 49.9 kg  aa5 

      (R); Height 5 ft. 2 in. (R);                                                                

14:40  / 87; Pulse 88; Resp 17; Temp 99.3(O); Pulse Ox 100% on R/A; Pain 9/10;          me1 

15:29  / 81; Pulse 84; Resp 17; Pulse Ox 98% on R/A; Pain 7/10;                         me1 

13:50 Body Mass Index 20.12 (49.90 kg, 157.48 cm)                                             aa5 

14:40 Pain Scale: Adult                                                                       me1 

15:29 Pain Scale: Adult                                                                       me1 

                                                                                                  

ED Course:                                                                                        

13:45 Patient arrived in ED.                                                                  am2 

13:46 Gregg Pineda DO is Attending Physician.                                                ms3 

13:50 Arm band placed on.                                                                     aa5 

13:52 Triage completed.                                                                       aa5 

13:56 Morena Valderrama, RN is Primary Nurse.                                                    ph  

14:35 Missed attempt(s): 22 gauge in right antecubital area. Bleeding controlled, band aid    ph  

      applied, catheter tip intact.                                                               

14:44 Initial lab(s) drawn, by me, sent to lab. Inserted saline lock: 22 gauge in left        ph  

      antecubital area, using aseptic technique. Blood collected.                                 

14:52 Patient has correct armband on for positive identification. Fall risk band placed.      me1 

      Placed in gown. Bed in low position. Call light in reach. Side rails up X 1. Provided       

      Education on: On POC, verbalized understanding. .                                           

14:52 No provider procedures requiring assistance completed.                                  me1 

15:53 Ervin Beaulieu DO is Referral Physician.                                                ms3 

15:54 Damian Bray MD is Referral Physician.                                              ms3 

16:18 IV discontinued, intact, bleeding controlled, No redness/swelling at site. Pressure     me1 

      dressing applied.                                                                           

                                                                                                  

Administered Medications:                                                                         

14:14 Drug: Phenazopyridine  mg Route: PO;                                              me1 

14:50 Follow up: Response: No adverse reaction; Marked relief of symptoms                     me1 

14:44 Drug: Lactated Ringers Solution IV 1000 ml Route: IV; Rate: bolus; Site: left           me1 

      antecubital;                                                                                

15:55 Follow up: IV Status: Completed infusion; IV Intake: 1000ml                             me1 

                                                                                                  

                                                                                                  

Medication:                                                                                       

14:53 VIS not applicable for this client.                                                     me1 

                                                                                                  

Intake:                                                                                           

15:55 IV: 1000ml; Total: 1000ml.                                                              me1 

                                                                                                  

Outcome:                                                                                          

15:54 Discharge ordered by MD.                                                                ms3 

16:17 Discharged to home ambulatory, with friend.                                             me1 

16:17 Condition: stable                                                                           

16:17 Discharge instructions given to patient, Instructed on follow up and referral plans.        

      Demonstrated understanding of Prescriptions given X 1.                                      

16:20 Patient left the ED.                                                                    me1 

                                                                                                  

Signatures:                                                                                       

Brooklyn Peralta RN                     RN   aa5                                                  

Morena Valderrama RN                      RN                                                      

Susana John                               am2                                                  

Gregg Pineda, DO CARMONA   ms3                                                  

Ele Brown, RN                  RN   me1                                                  

                                                                                                  

Corrections: (The following items were deleted from the chart)                                    

13:53 13:52 Arm band placed on aa5                                                            aa5 

                                                                                                  

**************************************************************************************************